# Patient Record
Sex: MALE | Race: WHITE | HISPANIC OR LATINO | URBAN - METROPOLITAN AREA
[De-identification: names, ages, dates, MRNs, and addresses within clinical notes are randomized per-mention and may not be internally consistent; named-entity substitution may affect disease eponyms.]

---

## 2017-07-02 ENCOUNTER — EMERGENCY (EMERGENCY)
Facility: HOSPITAL | Age: 22
LOS: 0 days | Discharge: HOME | End: 2017-07-02
Attending: EMERGENCY MEDICINE | Admitting: EMERGENCY MEDICINE
Payer: MEDICAID

## 2017-07-02 VITALS
WEIGHT: 160.06 LBS | HEIGHT: 67 IN | HEART RATE: 100 BPM | RESPIRATION RATE: 18 BRPM | OXYGEN SATURATION: 100 % | TEMPERATURE: 98 F | SYSTOLIC BLOOD PRESSURE: 122 MMHG | DIASTOLIC BLOOD PRESSURE: 68 MMHG

## 2017-07-02 VITALS
TEMPERATURE: 99 F | HEART RATE: 88 BPM | RESPIRATION RATE: 16 BRPM | DIASTOLIC BLOOD PRESSURE: 77 MMHG | OXYGEN SATURATION: 100 % | SYSTOLIC BLOOD PRESSURE: 128 MMHG
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
## 2017-07-02 DIAGNOSIS — S62.326A DISPLACED FRACTURE OF SHAFT OF FIFTH METACARPAL BONE, RIGHT HAND, INITIAL ENCOUNTER FOR CLOSED FRACTURE: ICD-10-CM

## 2017-07-02 DIAGNOSIS — S61.217A LACERATION WITHOUT FOREIGN BODY OF LEFT LITTLE FINGER WITHOUT DAMAGE TO NAIL, INITIAL ENCOUNTER: ICD-10-CM
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
## 2017-07-02 DIAGNOSIS — S69.82XA OTHER SPECIFIED INJURIES OF LEFT WRIST, HAND AND FINGER(S), INITIAL ENCOUNTER: ICD-10-CM
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
## 2017-07-02 PROCEDURE — 73120 X-RAY EXAM OF HAND: CPT | Mod: 26,50

## 2017-07-02 PROCEDURE — 72125 CT NECK SPINE W/O DYE: CPT | Mod: 26

## 2017-07-02 PROCEDURE — 70450 CT HEAD/BRAIN W/O DYE: CPT | Mod: 26

## 2017-07-02 PROCEDURE — 99284 EMERGENCY DEPT VISIT MOD MDM: CPT | Mod: 25

## 2017-07-02 PROCEDURE — 73130 X-RAY EXAM OF HAND: CPT | Mod: 26,RT

## 2017-07-02 RX ORDER — ONDANSETRON 8 MG/1
4 TABLET, FILM COATED ORAL ONCE
Qty: 0 | Refills: 0 | Status: COMPLETED | OUTPATIENT
Start: 2017-07-02 | End: 2017-07-02

## 2017-07-02 RX ORDER — CEFAZOLIN SODIUM 1 G
1000 VIAL (EA) INJECTION ONCE
Qty: 0 | Refills: 0 | Status: COMPLETED | OUTPATIENT
Start: 2017-07-02 | End: 2017-07-02

## 2017-07-02 RX ORDER — CEPHALEXIN 500 MG
1 CAPSULE ORAL
Qty: 20 | Refills: 0 | OUTPATIENT
Start: 2017-07-02 | End: 2017-07-07

## 2017-07-02 RX ORDER — MORPHINE SULFATE 50 MG/1
4 CAPSULE, EXTENDED RELEASE ORAL ONCE
Qty: 0 | Refills: 0 | Status: DISCONTINUED | OUTPATIENT
Start: 2017-07-02 | End: 2017-07-02

## 2017-07-02 RX ORDER — SODIUM CHLORIDE 9 MG/ML
1000 INJECTION INTRAMUSCULAR; INTRAVENOUS; SUBCUTANEOUS ONCE
Qty: 0 | Refills: 0 | Status: COMPLETED | OUTPATIENT
Start: 2017-07-02 | End: 2017-07-02

## 2017-07-02 RX ORDER — TETANUS TOXOID, REDUCED DIPHTHERIA TOXOID AND ACELLULAR PERTUSSIS VACCINE, ADSORBED 5; 2.5; 8; 8; 2.5 [IU]/.5ML; [IU]/.5ML; UG/.5ML; UG/.5ML; UG/.5ML
0.5 SUSPENSION INTRAMUSCULAR ONCE
Qty: 0 | Refills: 0 | Status: COMPLETED | OUTPATIENT
Start: 2017-07-02 | End: 2017-07-02

## 2017-07-02 RX ADMIN — SODIUM CHLORIDE 1000 MILLILITER(S): 9 INJECTION INTRAMUSCULAR; INTRAVENOUS; SUBCUTANEOUS at 04:20

## 2017-07-02 RX ADMIN — Medication 100 MILLIGRAM(S): at 05:15

## 2017-07-02 RX ADMIN — TETANUS TOXOID, REDUCED DIPHTHERIA TOXOID AND ACELLULAR PERTUSSIS VACCINE, ADSORBED 0.5 MILLILITER(S): 5; 2.5; 8; 8; 2.5 SUSPENSION INTRAMUSCULAR at 05:15

## 2017-07-02 NOTE — ED PROVIDER NOTE - CARE PLAN
Principal Discharge DX:	Closed nondisplaced fracture of base of fifth metacarpal bone of right hand, initial encounter
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
## 2017-07-02 NOTE — ED PROVIDER NOTE - PROGRESS NOTE DETAILS
Latesha Garrido: ED attending Dr. Moore discussed case with Dr. Steinberg who states that he will come to the ED at pt's bedside for eval, and agrees for suture repair. Latesha Garrido: ED attending Dr. Moore discussed case with Dr. Steinberg who states that he will come to the ED at pt's bedside for eval.
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
## 2021-11-26 ENCOUNTER — EMERGENCY (EMERGENCY)
Facility: HOSPITAL | Age: 26
LOS: 0 days | Discharge: HOME | End: 2021-11-26
Attending: HOSPITALIST
Payer: COMMERCIAL

## 2021-11-26 VITALS
HEART RATE: 61 BPM | OXYGEN SATURATION: 99 % | DIASTOLIC BLOOD PRESSURE: 80 MMHG | RESPIRATION RATE: 17 BRPM | TEMPERATURE: 99 F | SYSTOLIC BLOOD PRESSURE: 135 MMHG

## 2021-11-26 VITALS
HEIGHT: 67 IN | TEMPERATURE: 99 F | DIASTOLIC BLOOD PRESSURE: 85 MMHG | WEIGHT: 149.91 LBS | HEART RATE: 66 BPM | RESPIRATION RATE: 18 BRPM | SYSTOLIC BLOOD PRESSURE: 132 MMHG | OXYGEN SATURATION: 100 %

## 2021-11-26 DIAGNOSIS — R07.89 OTHER CHEST PAIN: ICD-10-CM
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
## 2021-11-26 DIAGNOSIS — M79.632 PAIN IN LEFT FOREARM: ICD-10-CM
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
## 2021-11-26 PROCEDURE — 99284 EMERGENCY DEPT VISIT MOD MDM: CPT

## 2021-11-26 PROCEDURE — 73090 X-RAY EXAM OF FOREARM: CPT | Mod: LT

## 2021-11-26 PROCEDURE — 73030 X-RAY EXAM OF SHOULDER: CPT | Mod: LT

## 2021-11-26 PROCEDURE — 93010 ELECTROCARDIOGRAM REPORT: CPT

## 2021-11-26 PROCEDURE — 73090 X-RAY EXAM OF FOREARM: CPT | Mod: 26,LT

## 2021-11-26 PROCEDURE — 93005 ELECTROCARDIOGRAM TRACING: CPT

## 2021-11-26 PROCEDURE — 71046 X-RAY EXAM CHEST 2 VIEWS: CPT

## 2021-11-26 PROCEDURE — 99284 EMERGENCY DEPT VISIT MOD MDM: CPT | Mod: 25

## 2021-11-26 PROCEDURE — 71046 X-RAY EXAM CHEST 2 VIEWS: CPT | Mod: 26

## 2021-11-26 PROCEDURE — 73030 X-RAY EXAM OF SHOULDER: CPT | Mod: 26,LT

## 2021-11-26 RX ORDER — IBUPROFEN 200 MG
600 TABLET ORAL ONCE
Refills: 0 | Status: COMPLETED | OUTPATIENT
Start: 2021-11-26 | End: 2021-11-26

## 2021-11-26 RX ADMIN — Medication 600 MILLIGRAM(S): at 19:54
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
## 2021-11-26 NOTE — ED STATDOCS - NS_ ATTENDINGSCRIBEDETAILS _ED_A_ED_FT
Gia Valdez MD: The history, relevant review of systems, past medical and surgical history, medical decision making, and physical examination was documented by the scribe in my presence and I attest to the accuracy of the documentation.
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
## 2021-11-26 NOTE — ED STATDOCS - MUSCULOSKELETAL, MLM
tender over the chest wall, L shoulder, and lateral L forearm full ROM, normal gait, non tender spine,
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
## 2021-11-26 NOTE — ED STATDOCS - PATIENT PORTAL LINK FT
You can access the FollowMyHealth Patient Portal offered by [facility] by registering at the following website: [email or web address]. By joining [facility]s FollowMyHealth portal, you will also be able to view your health information using other applications (apps) compatible with our system.

## 2021-11-26 NOTE — ED STATDOCS - ATTENDING CONTRIBUTION TO CARE
I, Gia Valdez MD,  performed the initial face to face bedside interview with this patient regarding history of present illness, review of symptoms and relevant past medical, social and family history.  I completed an independent physical examination.  I was the initial provider who evaluated this patient. I have signed out the follow up of any pending tests (i.e. labs, radiological studies) to the resident.  I have communicated the patient’s plan of care and disposition with the resident.

## 2023-01-16 ENCOUNTER — APPOINTMENT (OUTPATIENT)
Dept: ORTHOPEDIC SURGERY | Facility: CLINIC | Age: 28
End: 2023-01-16
Payer: OTHER MISCELLANEOUS

## 2023-01-16 DIAGNOSIS — Z78.9 OTHER SPECIFIED HEALTH STATUS: ICD-10-CM

## 2023-01-16 DIAGNOSIS — S13.9XXA SPRAIN OF JOINTS AND LIGAMENTS OF UNSPECIFIED PARTS OF NECK, INITIAL ENCOUNTER: ICD-10-CM

## 2023-01-16 PROBLEM — Z00.00 ENCOUNTER FOR PREVENTIVE HEALTH EXAMINATION: Status: ACTIVE | Noted: 2023-01-16

## 2023-01-16 PROCEDURE — 99204 OFFICE O/P NEW MOD 45 MIN: CPT

## 2023-01-16 PROCEDURE — 99072 ADDL SUPL MATRL&STAF TM PHE: CPT

## 2023-01-16 PROCEDURE — 72100 X-RAY EXAM L-S SPINE 2/3 VWS: CPT

## 2023-01-16 RX ORDER — IBUPROFEN 800 MG/1
TABLET, FILM COATED ORAL
Refills: 0 | Status: ACTIVE | COMMUNITY

## 2023-01-17 PROBLEM — S13.9XXA CERVICAL SPRAIN: Status: ACTIVE | Noted: 2023-01-16
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
## 2023-01-18 NOTE — ASSESSMENT
[FreeTextEntry1] : Upon examination Cervical sprain was noted. Lumbar sprain. Lumbar xray also showed signs of scoliosis. Patient was given a script for PT. Medrol dose pack and Flexeril prescribed to help with pain and inflammation. \par A Medrol dose Joseph was prescribed today.  Patient was informed that while taking the Medrol, they shouldn't be taking any other anti-inflammatories.  Pt understands and all questions were answered, and possible side effects of medrol were discussed.\par  \par F/u 3 weeks \par Entered by Zoey Moran as acting Scribe.\par Dr. Oconnor Attestation\par The documentation recorded by the scribe, in my presence, accurately reflects the service I, Dr. Oconnor, personally performed, and the decisions made by me with my edits as appropriate.\par \par

## 2023-01-18 NOTE — PHYSICAL EXAM
[Flexion] : flexion [Extension] : extension [Bending to left] : bending to left [Bending to right] : bending to right [Scoliosis] : Scoliosis [There are no fractures, subluxations or dislocations. No significant abnormalities are seen] : There are no fractures, subluxations or dislocations. No significant abnormalities are seen [] : no atrophy [de-identified] : Normal Achilles reflex [FreeTextEntry1] : mild RT radiculopathy and lumbar sprain  [TWNoteComboBox7] : forward flexion 30 degrees [de-identified] : extension 10 degrees [de-identified] : left lateral bending 10 degrees [de-identified] : left lateral rotation 10 degrees [de-identified] : right lateral bending 10 degrees [TWNoteComboBox6] : right lateral rotation 5 degrees

## 2023-01-18 NOTE — WORK
[Was the competent medical cause of the injury] : was the competent medical cause of the injury [Are consistent with the injury] : are consistent with the injury [Consistent with my objective findings] : consistent with my objective findings [Total] : total [Does not reveal pre-existing condition(s) that may affect treatment/prognosis] : does not reveal pre-existing condition(s) that may affect treatment/prognosis [Patient] : patient [Less than Sedentary Work:] :  Less than Sedentary Work: Unable to meet the requirement of Sedentary Work. [FreeTextEntry1] : good

## 2023-02-02 ENCOUNTER — APPOINTMENT (OUTPATIENT)
Dept: ORTHOPEDIC SURGERY | Facility: CLINIC | Age: 28
End: 2023-02-02
Payer: OTHER MISCELLANEOUS

## 2023-02-02 PROCEDURE — 99072 ADDL SUPL MATRL&STAF TM PHE: CPT

## 2023-02-02 PROCEDURE — 99214 OFFICE O/P EST MOD 30 MIN: CPT

## 2023-02-02 NOTE — ASSESSMENT
[FreeTextEntry1] : Pt received note to stay out of work until further notice\par follow up 3 weeks\par Pt has started PT and will continue \par \par Entered by Kia Samuels as acting Scribe.\par Dr. Oconnor Attestation\par The documentation recorded by the scribe, in my presence, accurately reflects the service I, Dr. Oconnor, personally performed, and the decisions made by me with my edits as appropriate.\par \par

## 2023-02-02 NOTE — PHYSICAL EXAM
[Flexion] : flexion [Extension] : extension [Bending to left] : bending to left [Bending to right] : bending to right [] : no atrophy [FreeTextEntry9] : l [de-identified] : Normal Achilles reflex [TWNoteComboBox7] : forward flexion 30 degrees [de-identified] : extension 10 degrees [de-identified] : left lateral bending 10 degrees [de-identified] : left lateral rotation 10 degrees [de-identified] : right lateral bending 10 degrees [TWNoteComboBox6] : right lateral rotation 5 degrees
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
## 2023-02-22 ENCOUNTER — APPOINTMENT (OUTPATIENT)
Dept: ORTHOPEDIC SURGERY | Facility: CLINIC | Age: 28
End: 2023-02-22
Payer: OTHER MISCELLANEOUS

## 2023-02-22 PROCEDURE — 99213 OFFICE O/P EST LOW 20 MIN: CPT

## 2023-02-22 PROCEDURE — 99072 ADDL SUPL MATRL&STAF TM PHE: CPT

## 2023-02-22 NOTE — ASSESSMENT
[FreeTextEntry1] : Pt is still having low back pain. He has gone to physical therapy without improvement. In an effort to try to get the pt back to work, I am requesting authorization for an MRI of his lumbar spine. In the meantime, we will continue physical therapy.\par He has right buttocks pain. He has no neurological deficits.\par \par Entered by Kia Samuels as acting Scribe.\par Dr. Oconnor Attestation\par The documentation recorded by the scribe, in my presence, accurately reflects the service I, Dr. Oconnor, personally performed, and the decisions made by me with my edits as appropriate.\par \par
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
## 2023-02-22 NOTE — PHYSICAL EXAM
[Flexion] : flexion [Extension] : extension [Bending to left] : bending to left [Bending to right] : bending to right [] : patient ambulates without assistive device [FreeTextEntry8] : right buttocks pain [de-identified] : Normal Achilles reflex [TWNoteComboBox7] : forward flexion 30 degrees [de-identified] : extension 10 degrees [de-identified] : left lateral bending 10 degrees [de-identified] : left lateral rotation 10 degrees [de-identified] : right lateral bending 10 degrees [TWNoteComboBox6] : right lateral rotation 5 degrees

## 2023-03-16 ENCOUNTER — FORM ENCOUNTER (OUTPATIENT)
Age: 28
End: 2023-03-16

## 2023-03-22 ENCOUNTER — NON-APPOINTMENT (OUTPATIENT)
Age: 28
End: 2023-03-22

## 2023-03-29 ENCOUNTER — FORM ENCOUNTER (OUTPATIENT)
Age: 28
End: 2023-03-29

## 2023-04-07 ENCOUNTER — APPOINTMENT (OUTPATIENT)
Dept: ORTHOPEDIC SURGERY | Facility: CLINIC | Age: 28
End: 2023-04-07
Payer: OTHER MISCELLANEOUS

## 2023-04-07 PROCEDURE — 99213 OFFICE O/P EST LOW 20 MIN: CPT

## 2023-04-07 NOTE — WORK
[Sprain/Strain] : sprain/strain [Was the competent medical cause of the injury] : was the competent medical cause of the injury [Are consistent with the injury] : are consistent with the injury [Consistent with my objective findings] : consistent with my objective findings [Moderate Partial] : moderate partial [Does not reveal pre-existing condition(s) that may affect treatment/prognosis] : does not reveal pre-existing condition(s) that may affect treatment/prognosis [Can return to work with limitations on ______] : can return to work with limitations on [unfilled] [Lifting] : lifting [N/A] : : Not Applicable [Patient] : patient [No Rx restrictions] : No Rx restrictions. [I provided the services listed above] :  I provided the services listed above. [FreeTextEntry1] : 50% [Light Work:] : Light Work: Exerting up to 20 pounds of force occasionally, and/or up to 10 pounds of force frequently and/or negligible amount of force constantly to move objects. Physical demand requirements are in excess of those for Sedentary Work. Even though the weight lifted may only be a negligible amount, a job should be rated Light Work: (1) when it requires walking or standing to a significant degree: or (2) when it requires sitting most of the time but entails pushing and/or pulling of arm or leg controls: and/or (3) when the job requires working at a production rate pace entailing the constant pushing and/or pulling of materials even though the weight of those materials is negligible. NOTE: The constant stress of maintaining a production rate pace, especially in an industrial setting, can be and is physically demanding of a worker even though the amount of force exerted is negligible.
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
## 2023-04-07 NOTE — DATA REVIEWED
[MRI] : MRI [Lumbar Spine] : lumbar spine [Report was reviewed and noted in the chart] : The report was reviewed and noted in the chart [I reviewed the films/CD and agree] : I reviewed the films/CD and agree [FreeTextEntry1] : MRI reviewed, demonstrating multiple bulging disks without focal disk herniation

## 2023-04-07 NOTE — IMAGING
[de-identified] : (Physical Exam: Lumbar Spine)\par \par Laterality is bilateral\par \par Patient is in no acute distress, alert and oriented\par Capillary refill is less than 2 seconds in the toes\par Lymphadenopathy is not present\par Peripheral edema is not present\par \par Skin is intact \par Swelling is not present \par Atrophy is not present\par Antalgic gait is not present \par \par Bony tenderness is not present \par Paraspinal tenderness is present \par Hip tenderness is not present\par Bony stepoff is not present\par \par Range of motion is normal\par \par Hip flexion strength is 5/5\par Hip abduction strength is 5/5\par Knee extension strength is 5/5\par Knee flexion strength is 5/5\par Ankle dorsiflexion strength is 5/5\par Ankle plantarflexion strength is 5/5\par Great toe dorsiflexion strength is 5/5\par Great toe plantarflexion strength is 5/5\par \par L3 sensation is intact\par L4 sensation is intact\par L5 sensation is intact\par S1 sensation is intact\par \par Patellar reflex is 2+\par Ankle jerk reflex is 2+\par \par Clonus is negative \par Baez's test is negative \par Straight leg raise is negative\par  [Outside films reviewed] : Outside films reviewed [No bony abnormalities] : No bony abnormalities [Straightening consistent with spasm] : Straightening consistent with spasm

## 2023-04-07 NOTE — DISCUSSION/SUMMARY
[de-identified] : (Assessment and Plan)\par \par History, clinical examination and imaging were most consistent with:\par -lumbar paraspinal strain \par \par The diagnosis was explained in detail. The potential non-surgical and surgical treatments were reviewed. The relative risks and benefits of each option were considered relative to the patient’s age, activity level, medical history, symptom severity and previously attempted treatments. \par \par -Non-surgical treatment was selected. \par -Patient defers additional PT and will proceed with a home exercise program. \par -Over the counter NSAID as needed for pain. GI precautions discussed.\par -Referral will be provided to pain management to evaluate for injection.\par -Work status will be return to work as light duty, no heavy lifting at this time.\par -Follow up with me in 6-8 weeks as needed.\par \par (MDM) \par \par Problem Complexity\par -Moderate: chronic illness with exacerbation\par \par Risk\par -Low: over the counter medication\par \par -Patient has not been seen by another provider in my practice within the past 2 years who specializes in orthopedic sports medicine, shoulder or elbow surgery. \par \par The patient was advised to consult with their primary medical provider prior to initiation of any new medications to reduce the risk of adverse effects specific to their long-term home medications and medical history. The risk of gastrointestinal irritation and kidney injury specific to long-term NSAID use was discussed.   \par \par

## 2023-05-30 ENCOUNTER — APPOINTMENT (OUTPATIENT)
Dept: PAIN MANAGEMENT | Facility: CLINIC | Age: 28
End: 2023-05-30
Payer: OTHER MISCELLANEOUS

## 2023-05-30 VITALS — WEIGHT: 265 LBS | HEIGHT: 70 IN | BODY MASS INDEX: 37.94 KG/M2

## 2023-05-30 PROCEDURE — 99204 OFFICE O/P NEW MOD 45 MIN: CPT

## 2023-05-30 RX ORDER — METHYLPREDNISOLONE 4 MG/1
4 TABLET ORAL
Qty: 1 | Refills: 0 | Status: DISCONTINUED | COMMUNITY
Start: 2023-01-19 | End: 2023-05-30

## 2023-05-30 RX ORDER — METHYLPREDNISOLONE 4 MG/1
4 TABLET ORAL
Qty: 1 | Refills: 0 | Status: DISCONTINUED | COMMUNITY
Start: 2023-01-16 | End: 2023-05-30

## 2023-05-30 RX ORDER — CYCLOBENZAPRINE HYDROCHLORIDE 10 MG/1
10 TABLET, FILM COATED ORAL
Qty: 10 | Refills: 0 | Status: DISCONTINUED | COMMUNITY
Start: 2023-01-19 | End: 2023-05-30

## 2023-05-30 NOTE — PHYSICAL EXAM
[de-identified] : Constitutional:  \par - No acute distress  \par - Well developed; well nourished  \par \par Neurological:  \par - normal mood and affect  \par - alert and oriented x 3   \par \par Cardiovascular:  \par - grossly normal \par \par Lumbar Spine Exam: \par \par Inspection: \par erythema (-) \par ecchymosis (-) \par rashes (-) \par alignment: no scoliosis \par \par Palpation: \par Midline lumbar tenderness:            (-) \par midline thoracic tenderness:          (-) \par Lumbar paraspinal tenderness:  L (-) ; R (-) \par thoracic paraspinal tenderness: L (-) ; R (-) \par sciatic nerve tenderness :          L (-) ; R (-) \par SI joint tenderness:                     L (-) ; R (-) \par GTB tenderness:                        L (-);  R (-) \par \par ROM: Full ROM pain with extreme flexion and extension\par \par Strength: \par                                    Right       Left    \par Hip Flexion:                (5/5)       (5/5) \par Quadriceps:               (5/5)       (5/5) \par Hamstrings:                (5/5)       (5/5) \par Ankle Dorsiflexion:     (5/5)       (5/5) \par EHL:                           (5/5)       (5/5) \par Ankle Plantarflexion:  (5/5)       (5/5) \par \par Special Tests: \par SLR:                            R (+) ; L (-) \par Facet loading:             R (-) ; L (-) \par SHAYNE test:                R (-) ; L (-) \par Hamstring tightness:   R (-);  L (-) \par \par Neurologic: \par SILT throughout right lower extremity \par SILT throughout left lower extremity \par \par Reflexes normal and symmetric bilateral lower extremities \par \par Gait: \par non- antalgic gait \par ambulates without assistive device

## 2023-05-30 NOTE — HISTORY OF PRESENT ILLNESS
[Lower back] : lower back [Work related] : work related [Sudden] : sudden [6] : 6 [Burning] : burning [Dull/Aching] : dull/aching [Intermittent] : intermittent [Work] : work [Meds] : meds [Injection therapy] : injection therapy [Bending forward] : bending forward [Extending back] : extending back [Full time] : Work status: full time [FreeTextEntry1] : The patient presents for initial evaluation regarding their low back pain.   Patient was referred by Dr. Kowalski.\par Patient injured himself while working as a  on 1/4/2023, he was lifting wood when he felt immediate onset pain. Patients pain is in the lower back with radiation up to the mid back, initially when the injury first happened he experienced radicular pain to the right buttock and leg, but these symptoms have improved with time. Patient has been through PT, and been prescribed cyclobenzaprine as well as 2 rounds of oral steroids, he currently takes OTC ibuprofen for pain management.  Patient working full-time as of late April.\par \par WC DOI: 1/4/2023\par Occupation: Construction\par Work status: Full-time\par \par Subjective weakness: No \par Lower extremity paresthesias: No \par Bladder/bowel dysfunction: No \par \par Injections: No \par \par Pertinent Surgical History: N/A \par \par Imaging: \par \par 1) MRI Lumbar Spine (3/30/2023) - [facility]\par There is a transitional appearing lumbosacral junction. For the purposes of this report the vertebral bodies will be enumerated such that S1 is partially lumbarized, there is a rudimentary S1-2 intervertebral disc space; by this designation the conus medullaris tip is at the upper L2 endplate level.\par At the L3-4 level there is a posterior disc bulge impressing upon the thecal sac.\par At the L5-S1 level there is a posterior disc herniation approaching the ventral thecal sac surface. There is a posterior annular tear.\par \par Physician Disclaimer: I have personally reviewed and confirmed all HPI data with the patient.  [] : no [FreeTextEntry3] : 01/04/2023 [FreeTextEntry7] : midback [FreeTextEntry8] : a [de-identified] : lumbar bea at [facility]  [de-identified] : construction

## 2023-05-30 NOTE — WORK
[Partial] : partial [No Rx restrictions] : No Rx restrictions. [I provided the services listed above] :  I provided the services listed above. [FreeTextEntry3] : Degree of impairment with regard to lumbar spine only

## 2023-06-06 ENCOUNTER — FORM ENCOUNTER (OUTPATIENT)
Age: 28
End: 2023-06-06

## 2023-06-09 ENCOUNTER — APPOINTMENT (OUTPATIENT)
Dept: ORTHOPEDIC SURGERY | Facility: CLINIC | Age: 28
End: 2023-06-09
Payer: OTHER MISCELLANEOUS

## 2023-06-09 VITALS — HEIGHT: 70 IN | WEIGHT: 250 LBS | BODY MASS INDEX: 35.79 KG/M2

## 2023-06-09 PROCEDURE — 99215 OFFICE O/P EST HI 40 MIN: CPT

## 2023-06-09 NOTE — HISTORY OF PRESENT ILLNESS
[Lower back] : lower back [Work related] : work related [Sudden] : sudden [5] : 5 [9] : 9 [Burning] : burning [Sharp] : sharp [Throbbing] : throbbing [Tingling] : tingling [Constant] : constant [Work] : work [Sleep] : sleep [Full time] : Work status: full time [de-identified] : ( Code 775030) Patient presents today with lower back pain after buying plywood, almost slipped, didn't want to break it and grabbed the plywood wrong at work on 1/4/23. Went to [facility], was given an injection and medication at the hospital. \par  Saw Dr. Oconnor, had xray, sent for MRI, and PT, and was prescribed Medrol and Cyclobenzaprine. Saw Dr. South 5/30/23,  approved injection and he will schedule it. States he completed 2 months of PT at [facility] in [place]. States his pain radiates into the buttock and up into the mid and upper back, has some tingling. Admits to taking Advil for pain. Admits to using OTC pain patches. Had lumbar spine xray at [facility] and MRI at [facility]. [] : no [FreeTextEntry3] : 1/4/23 [FreeTextEntry7] : into the buttock and up into the mid and upper back [de-identified] : lumbar spine xray at [facility] and MRI at [facility] [de-identified] : Construction

## 2023-06-09 NOTE — REASON FOR VISIT
[FreeTextEntry2] : lower back pain after buying plywood, almost slipped, didn't want to break it and grabbed the plywood wrong at work on 1/4/23 WC INJURY

## 2023-06-09 NOTE — ASSESSMENT
[FreeTextEntry1] : Transitional anatomy \par HNP L5-S1\par \par Worker's compensation 50% temporarily disabled \par \par Patient has JANET approved yesterday, patient waiting to schedule.\par \par Patient will begin physical therapy. \par \par Recommend: - NSAID - Heating pad - Muscle relaxer - Core strengthening exercise - Hamstring stretching exercise Patient is given back rehabilitation exercise book. \par \par Follow up in 2 months

## 2023-06-09 NOTE — DATA REVIEWED
[MRI] : MRI [Lumbar Spine] : lumbar spine [Report was reviewed and noted in the chart] : The report was reviewed and noted in the chart [I independently reviewed and interpreted images and report] : I independently reviewed and interpreted images and report [FreeTextEntry1] : Transitional anatomy \par HNP L5-S1

## 2023-06-15 ENCOUNTER — APPOINTMENT (OUTPATIENT)
Dept: ORTHOPEDIC SURGERY | Facility: CLINIC | Age: 28
End: 2023-06-15
Payer: OTHER MISCELLANEOUS

## 2023-06-15 VITALS — WEIGHT: 250 LBS | HEIGHT: 70 IN | BODY MASS INDEX: 35.79 KG/M2

## 2023-06-15 PROCEDURE — 99215 OFFICE O/P EST HI 40 MIN: CPT

## 2023-06-15 RX ORDER — METHYLPREDNISOLONE 4 MG/1
4 TABLET ORAL
Qty: 1 | Refills: 0 | Status: ACTIVE | COMMUNITY
Start: 2023-06-15 | End: 1900-01-01

## 2023-06-15 NOTE — ASSESSMENT
[FreeTextEntry1] : Transitional anatomy \par HNP L5-S1\par Right foraminal HNP L5-S1\par \par Worker's compensation 100% temporarily disabled \par \par Patient will begin Medrol.  Patient advised not to take any NSAIDs while taking the steroids. \par \par Patient will begin physical therapy. \par \par Recommend: - NSAID - Heating pad - Muscle relaxer - Core strengthening exercise - Hamstring stretching exercise Patient is given back rehabilitation exercise book. \par \par Follow up in 2 months

## 2023-06-15 NOTE — HISTORY OF PRESENT ILLNESS
[Lower back] : lower back [Work related] : work related [Sudden] : sudden [5] : 5 [9] : 9 [Burning] : burning [Sharp] : sharp [Throbbing] : throbbing [Tingling] : tingling [Constant] : constant [Work] : work [Sleep] : sleep [Full time] : Work status: full time [de-identified] : ( Code 222069) Follow up lumbar spine. States he has been having increased pain since Monday. States he feels strong pulsations. States he feels cramping and a warm sensation. States he is unable to sleep. States he has difficulty working. Admits to taking Advil for pain. Admits to using OTC pain patches.  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 and MRI at [facility]

## 2023-06-28 ENCOUNTER — APPOINTMENT (OUTPATIENT)
Dept: PAIN MANAGEMENT | Facility: CLINIC | Age: 28
End: 2023-06-28
Payer: OTHER MISCELLANEOUS

## 2023-06-28 PROCEDURE — 64483 NJX AA&/STRD TFRM EPI L/S 1: CPT | Mod: LT

## 2023-06-28 NOTE — PROCEDURE
[FreeTextEntry3] : Date of Service: 06/28/2023 \par \par Account: [id number]\par \par Patient: JOSE G JASSO \par \par YOB: 1995\par \par Age: 27 year \par \par Surgeon:      Asa South DO\par \par Assistant:    None\par \par Pre-Operative Diagnosis:         Lumbosacral Radiculopathy                 \par \par Post Operative Diagnosis:       Lumbosacral Radiculopathy               \par \par Procedure:             Bilateral L5-S1 transforaminal epidural steroid injection under fluoroscopic guidance.\par \par Anesthesia: MAC\par \par This procedure was carried out using fluoroscopic guidance.  The risks and benefits of the procedure were discussed extensively with the patient.  The consent of the patient was obtained and the following procedure was performed.  A timeout was performed with all essential staff present and the site and side were verified.\par \par The right L5-S1 neural foramen was then identified on right oblique "mendez dog" anatomical view at the 6 o' clock position using fluoroscopic guidance, and the area was marked. The overlying skin and subcutaneous structures were anesthetized using sterile technique with 1% Lidocaine.   A 22 gauge 5 inch spinal needle was directed toward the inferior (6 o'clock) position of the pedicle, which formed the roof of the identified foramen.  Once in the epidural space, after negative aspiration for heme and CSF, 1cc of Omnipaque contrast was injected to confirm epidural location and assess filling defects and rule out intravascular needle placement. \par \par Lumbar epidurogram showed no intravascular or intrathecal flow pattern.  No blood or CSF was aspirated.  Omnipaque spread medially in epidural space and outlined the exiting nerve root.  After this, 2.5 cc of a mixture of 4cc of preservative free normal saline plus 40 mg of Kenalog was injected in the epidural space.\par \par Then attention was focused to the left L5-S1 neural foramen. This was then identified on left oblique "mendez dog" anatomical view at the 6 o' clock position using fluoroscopic guidance, and the area was marked.  The overlying skin and subcutaneous structures were anesthetized using sterile technique with 1% Lidocaine.  A 22 gauge 5 inch spinal needle was directed toward the inferior (6 o'clock) position of the pedicle, which formed the roof of the identified foramen.  Once in the epidural space, after negative aspiration for heme and CSF, 1cc of Omnipaque contrast was injected to confirm epidural location and assess filling defects and rule out intravascular needle placement. \par \par The following contrast flow observed: no intravascular or intrathecal flow pattern was noted.  No blood or CSF was aspirated. Omnipaque spread medially in epidural space.  \par \par After this, the remainder of the injectate listed above was injected in the epidural space.\par \par Vital signs remained normal throughout the procedure.  The patient tolerated the procedure well.  There were no immediate complications from the performed procedure.  The patient was instructed to apply ice over the injection sites for twenty minutes every two hours for the next 24 hours.\par \par Disposition:\par      1.  The patient was advised to F/U in 1-2 weeks to assess the response to the injection.\par      2.  The patient was also instructed to contact me immediately if there were any concerns related to the procedure performed.

## 2023-07-18 ENCOUNTER — APPOINTMENT (OUTPATIENT)
Dept: PAIN MANAGEMENT | Facility: CLINIC | Age: 28
End: 2023-07-18
Payer: OTHER MISCELLANEOUS

## 2023-07-18 VITALS — WEIGHT: 250 LBS | HEIGHT: 70 IN | BODY MASS INDEX: 35.79 KG/M2

## 2023-07-18 DIAGNOSIS — M48.061 SPINAL STENOSIS, LUMBAR REGION WITHOUT NEUROGENIC CLAUDICATION: ICD-10-CM

## 2023-07-18 DIAGNOSIS — M51.36 OTHER INTERVERTEBRAL DISC DEGENERATION, LUMBAR REGION: ICD-10-CM

## 2023-07-18 PROCEDURE — 99214 OFFICE O/P EST MOD 30 MIN: CPT

## 2023-07-18 NOTE — WORK
[No Rx restrictions] : No Rx restrictions. [I provided the services listed above] :  I provided the services listed above. [Mild Partial] : mild partial [FreeTextEntry3] : Degree of impairment with regard to lumbar spine only

## 2023-07-18 NOTE — ASSESSMENT
[FreeTextEntry1] : A discussion regarding available pain management treatment options occurred with the patient.  These included interventional, rehabilitative, pharmacological, and alternative modalities. We will proceed with the following:  \par \par Interventional treatment options:\par - We will proceed with L4-L5 LESI (40 mg Kenalog) with return of severe radicular pain - will call\par - Advised that tachycardia may present once again following repeat corticosteroid administration\par - see additional instructions below  \par \par Rehabilitative options:   \par - Completed prior physical therapy trial with benefit\par - participation in active HEP was discussed and encouraged\par - Home exercise sheet provided\par \par Medication based treatment options:  \par - continue ibuprofen 400-600 mg TID as needed\par - Continue topical OTC analgesics as needed\par - see additional instructions below  \par \par Complementary treatment options:  \par - Weight management and lifestyle modifications discussed  \par \par Additional treatment recommendations as follows:  \par - Follow-up with Dr. Li as direct\par - Patient will follow-up for repeat injection or as needed basis\par \par The risks, benefits and alternatives of the proposed procedure were explained in detail with the patient. The risks outlined include but are not limited to infection, bleeding, post- dural puncture headache, nerve injury, a temporary increase in pain, failure to resolve symptoms, allergic reaction, and possible elevation of blood sugar in diabetics if using corticosteroid.  All questions were answered to patient's apparent satisfaction and he/she verbalized an understanding.\par \par We have discussed the risks, benefits, and alternatives NSAID therapy including but not limited to the risk of bleeding, thrombosis, gastric mucosal irritation/ulceration, allergic reaction and kidney dysfunction; the patient verbalizes an understanding.

## 2023-07-18 NOTE — HISTORY OF PRESENT ILLNESS
[Lower back] : lower back [Work related] : work related [Sudden] : sudden [6] : 6 [Burning] : burning [Dull/Aching] : dull/aching [Intermittent] : intermittent [Work] : work [Meds] : meds [Injection therapy] : injection therapy [Bending forward] : bending forward [Extending back] : extending back [Full time] : Work status: full time [FreeTextEntry1] : 7/18/2023 - Patient presents for FUV after a bilateral L5-S1 TFESI on 6/28/2023.  Patient reports minimum of 50% reduction of symptoms.  Patient has reduced medication usage.  Continues to work full-time.  Overall he is pleased with his response to the injection.  Patient reports some tachycardia following injection which lasted for approximately 1 week.\par \par 5/30/2023- The patient presents for initial evaluation regarding their low back pain.   Patient was referred by Dr. Kowalski. Patient injured himself while working as a  on 1/4/2023, he was lifting wood when he felt immediate onset pain. Patients pain is in the lower back with radiation up to the mid back, initially when the injury first happened he experienced radicular pain to the right buttock and leg, but these symptoms have improved with time. Patient has been through PT, and been prescribed cyclobenzaprine as well as 2 rounds of oral steroids, he currently takes OTC ibuprofen for pain management.  Patient working full-time as of late April.\par \par WC DOI: 1/4/2023\par Occupation: Construction\par Work status: Full-time\par \par Injections: \par 1) Bilateral L5-S1 TFESI (6/28/2023)\par \par Pertinent Surgical History: N/A \par \par Imaging: \par \par 1) MRI Lumbar Spine (3/30/2023) - [facility]\par There is a transitional appearing lumbosacral junction. For the purposes of this report the vertebral bodies will be enumerated such that S1 is partially lumbarized, there is a rudimentary S1-2 intervertebral disc space; by this designation the conus medullaris tip is at the upper L2 endplate level.\par At the L3-4 level there is a posterior disc bulge impressing upon the thecal sac.\par At the L5-S1 level there is a posterior disc herniation approaching the ventral thecal sac surface. There is a posterior annular tear.\par \par Physician Disclaimer: I have personally reviewed and confirmed all HPI data with the patient.  [] : no [FreeTextEntry3] : 01/04/2023 [FreeTextEntry7] : midback [de-identified] : lumbar bea at [facility]  [de-identified] : construction

## 2023-07-18 NOTE — PHYSICAL EXAM
[de-identified] : Constitutional:  \par - No acute distress  \par - Well developed; well nourished  \par \par Neurological:  \par - normal mood and affect  \par - alert and oriented x 3   \par \par Cardiovascular:  \par - grossly normal \par \par Lumbar Spine Exam: \par \par Inspection: \par erythema (-) \par ecchymosis (-) \par rashes (-) \par alignment: no scoliosis \par \par Palpation: \par Midline lumbar tenderness:            (-) \par midline thoracic tenderness:          (-) \par Lumbar paraspinal tenderness:  L (-) ; R (-) \par thoracic paraspinal tenderness: L (-) ; R (-) \par sciatic nerve tenderness :          L (-) ; R (-) \par SI joint tenderness:                     L (-) ; R (-) \par GTB tenderness:                        L (-);  R (-) \par \par ROM: Full ROM \par pain with extreme flexion\par \par Strength: \par                                    Right       Left    \par Hip Flexion:                (5/5)       (5/5) \par Quadriceps:               (5/5)       (5/5) \par Hamstrings:                (5/5)       (5/5) \par Ankle Dorsiflexion:     (5/5)       (5/5) \par EHL:                           (5/5)       (5/5) \par Ankle Plantarflexion:  (5/5)       (5/5) \par \par Special Tests: \par SLR:                            R (-) ; L (-) \par Facet loading:             R (-) ; L (-) \par SHAYNE test:                R (-) ; L (-) \par Hamstring tightness:   R (-);  L (-) \par \par Neurologic: \par SILT throughout right lower extremity \par SILT throughout left lower extremity \par \par Reflexes normal and symmetric bilateral lower extremities \par \par Gait: \par non- antalgic gait

## 2023-08-11 ENCOUNTER — APPOINTMENT (OUTPATIENT)
Dept: ORTHOPEDIC SURGERY | Facility: CLINIC | Age: 28
End: 2023-08-11
Payer: OTHER MISCELLANEOUS

## 2023-08-11 PROCEDURE — 99215 OFFICE O/P EST HI 40 MIN: CPT

## 2023-08-18 NOTE — ASSESSMENT
[FreeTextEntry1] : MRI lumbar spine demonstrates: Transitional anatomy  HNP L5-S1 Right foraminal HNP L5-S1  Patient with persistent symptoms refractory to non operative care. Patient is seeking surgical options. Patient is a candidate for surgery after failing extensive non operative care.  Worker's compensation 100% temporarily disabled as patient will need surgery.    Patient will continue physical therapy.   Recommend: - NSAID - Heating pad - Muscle relaxer - Core strengthening exercise - Hamstring stretching exercise Patient is given back rehabilitation exercise book.   Follow up in 2 months.

## 2023-08-18 NOTE — HISTORY OF PRESENT ILLNESS
[Lower back] : lower back [Work related] : work related [Sudden] : sudden [5] : 5 [9] : 9 [Burning] : burning [Sharp] : sharp [Throbbing] : throbbing [Tingling] : tingling [Constant] : constant [Work] : work [Sleep] : sleep [Full time] : Work status: full time [de-identified] : ( Code [id number]) Follow up lumbar spine  Pt had JANET with Dr South on 5/28/23 with short term relief- states pain started to return after ~2 months.  Denies going to PT- reports doing HEP with some relief.  Reports little improvement with Medrol Dose pack- states injection helped more.  [] : no [FreeTextEntry3] : 1/4/23 [FreeTextEntry7] : into the buttock and up into the mid and upper back [de-identified] : lumbar spine xray at [facility] and MRI at [facility] [de-identified] : Construction

## 2023-09-06 RX ORDER — IBUPROFEN 600 MG/1
600 TABLET, FILM COATED ORAL TWICE DAILY
Qty: 60 | Refills: 2 | Status: ACTIVE | COMMUNITY
Start: 2023-09-06 | End: 1900-01-01

## 2023-09-20 ENCOUNTER — APPOINTMENT (OUTPATIENT)
Dept: PAIN MANAGEMENT | Facility: CLINIC | Age: 28
End: 2023-09-20
Payer: OTHER MISCELLANEOUS

## 2023-09-20 PROCEDURE — 62323 NJX INTERLAMINAR LMBR/SAC: CPT

## 2023-10-04 ENCOUNTER — APPOINTMENT (OUTPATIENT)
Dept: ORTHOPEDIC SURGERY | Facility: CLINIC | Age: 28
End: 2023-10-04
Payer: OTHER MISCELLANEOUS

## 2023-10-04 DIAGNOSIS — S39.012A STRAIN OF MUSCLE, FASCIA AND TENDON OF LOWER BACK, INITIAL ENCOUNTER: ICD-10-CM

## 2023-10-04 PROCEDURE — 99215 OFFICE O/P EST HI 40 MIN: CPT

## 2023-10-10 ENCOUNTER — APPOINTMENT (OUTPATIENT)
Dept: PAIN MANAGEMENT | Facility: CLINIC | Age: 28
End: 2023-10-10
Payer: OTHER MISCELLANEOUS

## 2023-10-10 VITALS — BODY MASS INDEX: 35.79 KG/M2 | WEIGHT: 250 LBS | HEIGHT: 70 IN

## 2023-10-10 DIAGNOSIS — M54.16 RADICULOPATHY, LUMBAR REGION: ICD-10-CM

## 2023-10-10 DIAGNOSIS — M79.18 MYALGIA, OTHER SITE: ICD-10-CM

## 2023-10-10 PROCEDURE — 99213 OFFICE O/P EST LOW 20 MIN: CPT

## 2023-10-13 ENCOUNTER — APPOINTMENT (OUTPATIENT)
Dept: ORTHOPEDIC SURGERY | Facility: CLINIC | Age: 28
End: 2023-10-13

## 2023-11-20 ENCOUNTER — APPOINTMENT (OUTPATIENT)
Dept: ORTHOPEDIC SURGERY | Facility: CLINIC | Age: 28
End: 2023-11-20
Payer: OTHER MISCELLANEOUS

## 2023-11-20 VITALS — WEIGHT: 250 LBS | HEIGHT: 70 IN | BODY MASS INDEX: 35.79 KG/M2

## 2023-11-20 PROCEDURE — 99214 OFFICE O/P EST MOD 30 MIN: CPT

## 2023-12-11 ENCOUNTER — RESULT REVIEW (OUTPATIENT)
Age: 28
End: 2023-12-11

## 2024-01-15 ENCOUNTER — APPOINTMENT (OUTPATIENT)
Dept: ORTHOPEDIC SURGERY | Facility: CLINIC | Age: 29
End: 2024-01-15
Payer: OTHER MISCELLANEOUS

## 2024-01-15 PROCEDURE — 99214 OFFICE O/P EST MOD 30 MIN: CPT

## 2024-01-15 RX ORDER — CYCLOBENZAPRINE HYDROCHLORIDE 10 MG/1
10 TABLET, FILM COATED ORAL
Qty: 30 | Refills: 1 | Status: ACTIVE | COMMUNITY
Start: 2023-01-16 | End: 1900-01-01

## 2024-02-05 NOTE — ASSESSMENT
[FreeTextEntry1] : 3/2023 MRI lumbar spine demonstrates: Transitional anatomy HNP L5-S1 Right foraminal HNP L5-S1  12/2023 MRI of L-Spine was reviewed today and is as follows:  Transitional anatomy HNP L5-S1 with stenosis  Right foraminal HNP L5-S1  Patient presents today for follow up on his lumbar spine. MRI detailed above consistent with findings of previous study. Discussed with patient that given lack of improvement with JANET, PT, and medication management, surgery is indicated.   - Patient with persistent symptoms refractory to non-operative care. Patient is seeking surgical options. Patient is a candidate for surgery after failing extensive non operative care.   - Patient given a prescription for gabapentin 300mg TID today for their nerve pain. Advised patient on potential side effects, including drowsiness.   - Patient given prescription for Flexeril 10mg today. Advised patient that medication may make them drowsy, start by taking it at night.   - Recommend NSAIDs PRN - Recommend heating pad use to decrease muscle spasm - Discussed the importance of home exercises, including but not limited to hamstring stretching and core strengthening  Patient was educated on their diagnosis today. All questions answered and patient expressed understanding.  Worker's compensation 100% temporarily disabled.   Follow up PRN then 2 weeks after surgery

## 2024-02-05 NOTE — HISTORY OF PRESENT ILLNESS
[Lower back] : lower back [Work related] : work related [Sudden] : sudden [5] : 5 [9] : 9 [Burning] : burning [Sharp] : sharp [Throbbing] : throbbing [Tingling] : tingling [Constant] : constant [Work] : work [Sleep] : sleep [Full time] : Work status: full time [de-identified] : ( Code [id number]) Follow up lumbar spine MRI Results at . Patient states he has severe pain radiating down the right leg, has increased numbness/tingling. Patient states his right leg feels "electric shocks" shooting down his right leg. Patient states he can barely doing HEP due to the severity of his pain. Patient admits to using IcyHot with no relief. Patient admits to taking Advil for pain with no relief.  Today's Pain 10/10 [] : no [FreeTextEntry3] : 1/4/23 [de-identified] : lumbar spine xray at [facility] and MRI at [facility] [FreeTextEntry7] : into the buttock and up into the mid and upper back [de-identified] : Construction

## 2024-02-05 NOTE — DATA REVIEWED
[MRI] : MRI [Lumbar Spine] : lumbar spine [I independently reviewed and interpreted images and report] : I independently reviewed and interpreted images and report [FreeTextEntry1] : 12/2023 MRI of L-Spine was reviewed today and is as follows:  Transitional anatomy HNP L5-S1 with stenosis

## 2024-02-05 NOTE — DISCUSSION/SUMMARY
[de-identified] : Patient with transitional anatomy on MRI with sacralization of L5 vertebral body. We are counting herniated disc at L5-S1 level, although radiologist report considered that as L4-5 level.   I discussed non operative and operative treatment options in great detail with the patient. I discussed treatment options, including but not limited to, 1. Non operative treatment - rest, NSAID, physical therapy etc. 2. Interventional treatment - injections etc. 3. Surgical treatment - MIS laminectomy, discectomy L5-S1  - Patient with persistent symptoms refractory to non-operative care. Patient is seeking surgical options. Patient is a candidate for surgery after failing extensive non operative care.  Patient wants to proceed with surgical intervention after failing nonoperative care. I had a lengthy discussion with the patient about the rational and goal of the surgery as well as expected outcome. I explained postoperative rehabilitation and recovery process to the patient. I discussed risks, benefits and alternatives of the procedure in detail with the patient. I counseled patient about risks and possible complications, including but not limited to, infection, bleeding, nerve injury, arterial and venous injury, single or multiple muscle group weakness, dural tear, CSF leak, pseudomeningocele, arachnoiditis, CSF fistula, meningitis, discitis, osteomyelitis, epidural hematoma, DVT, PE, CRPS, MI, paralysis,  recurrent disc herniation, post laminectomy instability, need for fusion, persistent symptoms, and risks of anesthesia. I explained to the patient that the surgical outcome is unpredictable and there is no guarantee that the symptoms will resolve after the surgery. The patient understands and wishes to proceed. All questions were answered and patient was given information to review.

## 2024-02-26 ENCOUNTER — APPOINTMENT (OUTPATIENT)
Dept: ORTHOPEDIC SURGERY | Facility: CLINIC | Age: 29
End: 2024-02-26
Payer: OTHER MISCELLANEOUS

## 2024-02-26 PROCEDURE — 99214 OFFICE O/P EST MOD 30 MIN: CPT

## 2024-02-26 NOTE — HISTORY OF PRESENT ILLNESS
[de-identified] : ( Code 921019 ) Follow up lumbar spine.  Pt reports increase in pain since last visit Denies PT or injections since last visit Reports slight relief with Ibuprofen and some relief with Cyclobenzaprine but states they "make him feel dizzy"  Pt reports continued pain radiating down right leg.  WC APPROVED SURGERY AS OF TODAY.  Today's Pain ~10/10

## 2024-02-26 NOTE — ASSESSMENT
[FreeTextEntry1] : 3/2023 MRI lumbar spine demonstrates: Transitional anatomy HNP L5-S1 Right foraminal HNP L5-S1  12/2023 MRI of L-Spine was reviewed today and is as follows:  Transitional anatomy HNP L5-S1 with stenosis  Right foraminal HNP L5-S1  Patient presents today for follow up on his lumbar spine. MRI detailed above consistent with findings of previous study. Discussed with patient that given lack of improvement with multiple JANET, PT, and medication management, surgery is indicated.   - Patient with persistent symptoms refractory to non-operative care. Patient is seeking surgical options. Patient is a candidate for surgery after failing extensive non operative care.   - Continue gabapentin, muscle relaxers as needed  - Recommend NSAIDs PRN - Recommend heating pad use to decrease muscle spasm - Discussed the importance of home exercises, including but not limited to hamstring stretching and core strengthening  Patient was educated on their diagnosis today. All questions answered and patient expressed understanding.  Worker's compensation 100% temporarily disabled.   Follow up PRN then 2 weeks after surgery

## 2024-02-27 ENCOUNTER — RESULT CHARGE (OUTPATIENT)
Age: 29
End: 2024-02-27

## 2024-03-04 ENCOUNTER — APPOINTMENT (OUTPATIENT)
Dept: ORTHOPEDIC SURGERY | Facility: CLINIC | Age: 29
End: 2024-03-04

## 2024-03-12 ENCOUNTER — NON-APPOINTMENT (OUTPATIENT)
Age: 29
End: 2024-03-12

## 2024-03-12 ENCOUNTER — APPOINTMENT (OUTPATIENT)
Dept: PHYSICAL MEDICINE AND REHAB | Facility: CLINIC | Age: 29
End: 2024-03-12
Payer: OTHER MISCELLANEOUS

## 2024-03-12 VITALS
RESPIRATION RATE: 16 BRPM | HEIGHT: 70 IN | WEIGHT: 270 LBS | OXYGEN SATURATION: 100 % | TEMPERATURE: 97.6 F | SYSTOLIC BLOOD PRESSURE: 124 MMHG | DIASTOLIC BLOOD PRESSURE: 72 MMHG | BODY MASS INDEX: 38.65 KG/M2 | HEART RATE: 88 BPM

## 2024-03-12 DIAGNOSIS — M51.26 OTHER INTERVERTEBRAL DISC DISPLACEMENT, LUMBAR REGION: ICD-10-CM

## 2024-03-12 DIAGNOSIS — M51.37 OTHER INTERVERTEBRAL DISC DEGENERATION, LUMBOSACRAL REGION: ICD-10-CM

## 2024-03-12 DIAGNOSIS — M21.371 FOOT DROP, RIGHT FOOT: ICD-10-CM

## 2024-03-12 DIAGNOSIS — M51.36 OTHER INTERVERTEBRAL DISC DEGENERATION, LUMBAR REGION: ICD-10-CM

## 2024-03-12 DIAGNOSIS — M47.817 SPONDYLOSIS W/OUT MYELOPATHY OR RADICULOPATHY, LUMBOSACRAL REGION: ICD-10-CM

## 2024-03-12 DIAGNOSIS — Z01.818 ENCOUNTER FOR OTHER PREPROCEDURAL EXAMINATION: ICD-10-CM

## 2024-03-12 DIAGNOSIS — M54.16 RADICULOPATHY, LUMBAR REGION: ICD-10-CM

## 2024-03-12 PROCEDURE — 93000 ELECTROCARDIOGRAM COMPLETE: CPT

## 2024-03-12 PROCEDURE — 36410 VNPNXR 3YR/> PHY/QHP DX/THER: CPT

## 2024-03-12 PROCEDURE — 99244 OFF/OP CNSLTJ NEW/EST MOD 40: CPT | Mod: ACP

## 2024-03-12 NOTE — PHYSICAL EXAM
[No Acute Distress] : no acute distress [Well Nourished] : well nourished [Well Developed] : well developed [Well-Appearing] : well-appearing [Normal Sclera/Conjunctiva] : normal sclera/conjunctiva [PERRL] : pupils equal round and reactive to light [EOMI] : extraocular movements intact [Normal Outer Ear/Nose] : the outer ears and nose were normal in appearance [Normal Oropharynx] : the oropharynx was normal [No JVD] : no jugular venous distention [No Lymphadenopathy] : no lymphadenopathy [Supple] : supple [Thyroid Normal, No Nodules] : the thyroid was normal and there were no nodules present [No Respiratory Distress] : no respiratory distress  [No Accessory Muscle Use] : no accessory muscle use [Normal Rate] : normal rate  [Clear to Auscultation] : lungs were clear to auscultation bilaterally [Regular Rhythm] : with a regular rhythm [Normal S1, S2] : normal S1 and S2 [No Murmur] : no murmur heard [No Carotid Bruits] : no carotid bruits [No Abdominal Bruit] : a ~M bruit was not heard ~T in the abdomen [No Varicosities] : no varicosities [Pedal Pulses Present] : the pedal pulses are present [No Edema] : there was no peripheral edema [No Palpable Aorta] : no palpable aorta [No Extremity Clubbing/Cyanosis] : no extremity clubbing/cyanosis [Soft] : abdomen soft [Non Tender] : non-tender [Non-distended] : non-distended [No Masses] : no abdominal mass palpated [Normal Bowel Sounds] : normal bowel sounds [No HSM] : no HSM [Normal Posterior Cervical Nodes] : no posterior cervical lymphadenopathy [Normal Anterior Cervical Nodes] : no anterior cervical lymphadenopathy [No CVA Tenderness] : no CVA  tenderness [No Joint Swelling] : no joint swelling [No Spinal Tenderness] : no spinal tenderness [No Rash] : no rash [Grossly Normal Strength/Tone] : grossly normal strength/tone [Coordination Grossly Intact] : coordination grossly intact [Normal Gait] : normal gait [No Focal Deficits] : no focal deficits [Deep Tendon Reflexes (DTR)] : deep tendon reflexes were 2+ and symmetric [Normal Affect] : the affect was normal [Normal Insight/Judgement] : insight and judgment were intact

## 2024-03-13 LAB
ANION GAP SERPL CALC-SCNC: 13 MMOL/L
BASOPHILS # BLD AUTO: 0.07 K/UL
BASOPHILS NFR BLD AUTO: 0.8 %
BUN SERPL-MCNC: 7 MG/DL
CALCIUM SERPL-MCNC: 9.5 MG/DL
CHLORIDE SERPL-SCNC: 101 MMOL/L
CO2 SERPL-SCNC: 24 MMOL/L
CREAT SERPL-MCNC: 0.66 MG/DL
EGFR: 129 ML/MIN/1.73M2
EOSINOPHIL # BLD AUTO: 0.18 K/UL
EOSINOPHIL NFR BLD AUTO: 2.1 %
GLUCOSE SERPL-MCNC: 93 MG/DL
HCT VFR BLD CALC: 48.4 %
HGB BLD-MCNC: 15.5 G/DL
IMM GRANULOCYTES NFR BLD AUTO: 0.2 %
LYMPHOCYTES # BLD AUTO: 3.09 K/UL
LYMPHOCYTES NFR BLD AUTO: 36.5 %
MAN DIFF?: NORMAL
MCHC RBC-ENTMCNC: 28.3 PG
MCHC RBC-ENTMCNC: 32 GM/DL
MCV RBC AUTO: 88.5 FL
MONOCYTES # BLD AUTO: 0.63 K/UL
MONOCYTES NFR BLD AUTO: 7.4 %
NEUTROPHILS # BLD AUTO: 4.47 K/UL
NEUTROPHILS NFR BLD AUTO: 53 %
PLATELET # BLD AUTO: 279 K/UL
POTASSIUM SERPL-SCNC: 4.3 MMOL/L
RBC # BLD: 5.47 M/UL
RBC # FLD: 13.9 %
SODIUM SERPL-SCNC: 138 MMOL/L
WBC # FLD AUTO: 8.46 K/UL

## 2024-03-13 NOTE — PLAN
[FreeTextEntry1] : Labs Drawn by Dr. Yan Jones due to poor venous access.  Patient required lab testing due to conditions in their past medical history requiring periodic monitoring.  Labs were sent to [facility].  EKG - NSR -79 , TINA - 0.142, No acute T wave changes noted..   Labs reviewed  Increase fluids  Patient is medically optimized at this time and may proceed with proposed procedure.  This clearance has been forwarded to Dr. Li and his office has been contacted providing medical clearance and all associated documents for this patient.  At least  45 minutes was spent with patient face to face examining and reviewing findings/results during this visit. Ample time was provided to answer any questions or address concerns to the patients satisfaction..  I, Danelle Chery, attest that this documentation has been prepared under the direction and in the presence of Provider Yan Jones DNP The documentation recorded by the scribe, in my presence, accurately reflects the service I personally performed, and the decisions made by me with my edits as appropriate. Yan Jones DNP

## 2024-03-13 NOTE — HISTORY OF PRESENT ILLNESS
[FreeTextEntry1] : Medical clearance  [de-identified] : Patient encounter today for medical clearance requested by Dr. Li for lumbar surgery scheduled on  3/27/24 at [facility].   States they have been doing well.  Denies any CP, SOB or diff breathing.  No recent fever, chills, cough or cold type symptoms. No recent LOC or head trauma. Denies a history of seizure disorder. Denies lose bridges, caps, or dentures. Denies complications with anesthesia in the past. Patient had a physical within the last year.  Has no other complaints at this time.

## 2024-03-27 ENCOUNTER — APPOINTMENT (OUTPATIENT)
Dept: ORTHOPEDIC SURGERY | Facility: AMBULATORY SURGERY CENTER | Age: 29
End: 2024-03-27
Payer: OTHER MISCELLANEOUS

## 2024-03-27 PROCEDURE — 63047 LAM FACETEC & FORAMOT LUMBAR: CPT

## 2024-03-27 PROCEDURE — 76000 FLUOROSCOPY <1 HR PHYS/QHP: CPT | Mod: 26,59

## 2024-03-27 RX ORDER — CYCLOBENZAPRINE HYDROCHLORIDE 5 MG/1
5 TABLET, FILM COATED ORAL 3 TIMES DAILY
Qty: 60 | Refills: 0 | Status: ACTIVE | COMMUNITY
Start: 2024-03-27 | End: 1900-01-01

## 2024-03-27 RX ORDER — OXYCODONE AND ACETAMINOPHEN 5; 325 MG/1; MG/1
5-325 TABLET ORAL
Qty: 30 | Refills: 0 | Status: ACTIVE | COMMUNITY
Start: 2024-03-27 | End: 1900-01-01

## 2024-03-28 RX ORDER — GABAPENTIN 300 MG/1
300 CAPSULE ORAL
Qty: 90 | Refills: 1 | Status: ACTIVE | COMMUNITY
Start: 2024-01-15 | End: 1900-01-01

## 2024-04-08 ENCOUNTER — APPOINTMENT (OUTPATIENT)
Dept: ORTHOPEDIC SURGERY | Facility: CLINIC | Age: 29
End: 2024-04-08
Payer: OTHER MISCELLANEOUS

## 2024-04-08 PROCEDURE — 99024 POSTOP FOLLOW-UP VISIT: CPT

## 2024-04-08 NOTE — HISTORY OF PRESENT ILLNESS
[de-identified] : S/P Laminoforaminotomy DIscectomy L4-5 3/27/2024 Pt reports improvement in pain since sx Reports taking Oxycodone, Cyclobenzaprine, and Ibuprofen  Pt reports some continued pain in right side of back and buttock Pt denies fever, chills, SOB

## 2024-04-08 NOTE — ASSESSMENT
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 Patient doing well, minimal back pain. Incision clean, dry, intact. Patient with some residual nerve symptoms, normal for this stage following surgery.    - Patient will start HEP as detailed in home exercise booklet given in office. Emphasized daily stretching and strengthening.   - Continue gabapentin, muscle relaxers as needed  - Recommend NSAIDs PRN - Recommend heating pad use to decrease muscle spasm - Discussed the importance of home exercises, including but not limited to hamstring stretching and core strengthening  Patient was educated on their diagnosis today. All questions answered and patient expressed understanding.  Worker's compensation 100% temporarily disabled.   Follow up in 1 month

## 2024-05-06 ENCOUNTER — APPOINTMENT (OUTPATIENT)
Dept: ORTHOPEDIC SURGERY | Facility: CLINIC | Age: 29
End: 2024-05-06
Payer: OTHER MISCELLANEOUS

## 2024-05-06 DIAGNOSIS — Z98.890 OTHER SPECIFIED POSTPROCEDURAL STATES: ICD-10-CM

## 2024-05-06 PROCEDURE — 99024 POSTOP FOLLOW-UP VISIT: CPT

## 2024-05-06 NOTE — ASSESSMENT
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 Patient with much improvement since last visit, ROM and strength improved. I recommend proceeding with PT at this time for improvements in ROM postoperatively.   - Recommend physical therapy to regain range of motion, strengthening and symptomatic improvement. Prescription given in office today.   - Continue gabapentin, muscle relaxers as needed  - Recommend NSAIDs PRN - Recommend heating pad use to decrease muscle spasm - Discussed the importance of home exercises, including but not limited to hamstring stretching and core strengthening  Patient was educated on their diagnosis today. All questions answered and patient expressed understanding.  Worker's compensation 100% temporarily disabled.   Follow up in 2 months

## 2024-05-06 NOTE — HISTORY OF PRESENT ILLNESS
[de-identified] : ( Code [id number]) S/P Laminoforaminotomy Discectomy L4-5 3/27/2024. Patient states he feels some pain, burning, and irritation after HEP. Patient ran out of Gabapentin and Cyclobenzaprine. Patient admits to taking Advil for pain.  Today's Pain with walking 6-7/10

## 2024-06-19 ENCOUNTER — NON-APPOINTMENT (OUTPATIENT)
Age: 29
End: 2024-06-19

## 2024-07-08 ENCOUNTER — APPOINTMENT (OUTPATIENT)
Dept: ORTHOPEDIC SURGERY | Facility: CLINIC | Age: 29
End: 2024-07-08
Payer: OTHER MISCELLANEOUS

## 2024-07-08 DIAGNOSIS — M47.814 SPONDYLOSIS W/OUT MYELOPATHY OR RADICULOPATHY, THORACIC REGION: ICD-10-CM

## 2024-07-08 DIAGNOSIS — Z98.890 OTHER SPECIFIED POSTPROCEDURAL STATES: ICD-10-CM

## 2024-07-08 PROCEDURE — 99214 OFFICE O/P EST MOD 30 MIN: CPT

## 2024-08-17 ENCOUNTER — RESULT REVIEW (OUTPATIENT)
Age: 29
End: 2024-08-17

## 2024-09-16 ENCOUNTER — APPOINTMENT (OUTPATIENT)
Dept: ORTHOPEDIC SURGERY | Facility: CLINIC | Age: 29
End: 2024-09-16
Payer: OTHER MISCELLANEOUS

## 2024-09-16 DIAGNOSIS — M51.24 OTHER INTERVERTEBRAL DISC DISPLACEMENT, THORACIC REGION: ICD-10-CM

## 2024-09-16 DIAGNOSIS — Z98.890 OTHER SPECIFIED POSTPROCEDURAL STATES: ICD-10-CM

## 2024-09-16 PROCEDURE — 99214 OFFICE O/P EST MOD 30 MIN: CPT

## 2024-09-16 NOTE — HISTORY OF PRESENT ILLNESS
[de-identified] : Body part: lower back, S/P Laminoforaminotomy Discectomy L4-5 3/27/2024 Better/ Worse/ Same since last visit: same Treatments since last visit: thoracic spine MRI at  Radicular symptoms: into the right glute Paresthesia: none Current medications for pain: Ibuprofen PRN Assistive walking device: none  Today's Pain: 6-7/10  Current work status (if Workers Comp): full time, no restrictions

## 2024-09-16 NOTE — HISTORY OF PRESENT ILLNESS
[de-identified] : Body part: lower back, S/P Laminoforaminotomy Discectomy L4-5 3/27/2024 Better/ Worse/ Same since last visit: same Treatments since last visit: thoracic spine MRI at  Radicular symptoms: into the right glute Paresthesia: none Current medications for pain: Ibuprofen PRN Assistive walking device: none  Today's Pain: 6-7/10  Current work status (if Workers Comp): full time, no restrictions

## 2024-09-16 NOTE — ASSESSMENT
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 Patient with continued resolution of low back pain and radiculopathy following surgery. However, MRI of the thoracic spine with multiple small HNP, likely cause of patient's pain. I discussed with patient will likely improve with PT. However, if persistent, may consider JANET in the future.   - Rx given for Flexeril today   - Recommend physical therapy to regain range of motion, strengthening and symptomatic improvement. Prescription given in office today.    - Patient will continue HEP as detailed in home exercise booklet given in office. Emphasized daily stretching and strengthening.   - Recommend NSAIDs PRN - Recommend heating pad use to decrease muscle spasm - Discussed the importance of home exercises, including but not limited to hamstring stretching and core strengthening  Patient was educated on their diagnosis today. All questions answered and patient expressed understanding.  Worker's compensation 0% temporarily disabled.   Follow up in 2 months

## 2024-09-16 NOTE — DATA REVIEWED
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

## 2024-11-11 ENCOUNTER — APPOINTMENT (OUTPATIENT)
Dept: ORTHOPEDIC SURGERY | Facility: CLINIC | Age: 29
End: 2024-11-11
Payer: OTHER MISCELLANEOUS

## 2024-11-11 DIAGNOSIS — Z98.890 OTHER SPECIFIED POSTPROCEDURAL STATES: ICD-10-CM

## 2024-11-11 DIAGNOSIS — M51.24 OTHER INTERVERTEBRAL DISC DISPLACEMENT, THORACIC REGION: ICD-10-CM

## 2024-11-11 PROCEDURE — 99214 OFFICE O/P EST MOD 30 MIN: CPT

## 2025-01-13 ENCOUNTER — APPOINTMENT (OUTPATIENT)
Dept: PAIN MANAGEMENT | Facility: CLINIC | Age: 30
End: 2025-01-13

## 2025-01-23 ENCOUNTER — APPOINTMENT (OUTPATIENT)
Dept: PAIN MANAGEMENT | Facility: CLINIC | Age: 30
End: 2025-01-23
Payer: OTHER MISCELLANEOUS

## 2025-01-23 VITALS — WEIGHT: 289 LBS | BODY MASS INDEX: 41.37 KG/M2 | HEIGHT: 70 IN

## 2025-01-23 DIAGNOSIS — M54.14 RADICULOPATHY, THORACIC REGION: ICD-10-CM

## 2025-01-23 DIAGNOSIS — M79.18 MYALGIA, OTHER SITE: ICD-10-CM

## 2025-01-23 PROCEDURE — 99214 OFFICE O/P EST MOD 30 MIN: CPT

## 2025-01-27 ENCOUNTER — APPOINTMENT (OUTPATIENT)
Dept: ORTHOPEDIC SURGERY | Facility: CLINIC | Age: 30
End: 2025-01-27
Payer: OTHER MISCELLANEOUS

## 2025-01-27 PROCEDURE — 99214 OFFICE O/P EST MOD 30 MIN: CPT

## 2025-02-06 ENCOUNTER — APPOINTMENT (OUTPATIENT)
Dept: PHYSICAL MEDICINE AND REHAB | Facility: CLINIC | Age: 30
End: 2025-02-06
Payer: OTHER MISCELLANEOUS

## 2025-02-06 VITALS
BODY MASS INDEX: 41.37 KG/M2 | HEART RATE: 76 BPM | WEIGHT: 289 LBS | HEIGHT: 70 IN | DIASTOLIC BLOOD PRESSURE: 80 MMHG | SYSTOLIC BLOOD PRESSURE: 120 MMHG

## 2025-02-06 DIAGNOSIS — M47.814 SPONDYLOSIS W/OUT MYELOPATHY OR RADICULOPATHY, THORACIC REGION: ICD-10-CM

## 2025-02-06 DIAGNOSIS — M79.18 MYALGIA, OTHER SITE: ICD-10-CM

## 2025-02-06 DIAGNOSIS — Z98.890 OTHER SPECIFIED POSTPROCEDURAL STATES: ICD-10-CM

## 2025-02-06 DIAGNOSIS — M51.24 OTHER INTERVERTEBRAL DISC DISPLACEMENT, THORACIC REGION: ICD-10-CM

## 2025-02-06 PROCEDURE — 99205 OFFICE O/P NEW HI 60 MIN: CPT

## 2025-03-21 ENCOUNTER — APPOINTMENT (OUTPATIENT)
Dept: PHYSICAL MEDICINE AND REHAB | Facility: CLINIC | Age: 30
End: 2025-03-21
Payer: OTHER MISCELLANEOUS

## 2025-03-21 DIAGNOSIS — M79.18 MYALGIA, OTHER SITE: ICD-10-CM

## 2025-03-21 DIAGNOSIS — M54.16 RADICULOPATHY, LUMBAR REGION: ICD-10-CM

## 2025-03-21 DIAGNOSIS — M51.24 OTHER INTERVERTEBRAL DISC DISPLACEMENT, THORACIC REGION: ICD-10-CM

## 2025-03-21 PROCEDURE — 97811 ACUP 1/> W/O ESTIM EA ADD 15: CPT | Mod: 59

## 2025-03-21 PROCEDURE — 97814 ACUP 1/> W/ESTIM EA ADDL 15: CPT

## 2025-03-21 PROCEDURE — 97813 ACUP 1/> W/ESTIM 1ST 15 MIN: CPT

## 2025-03-24 ENCOUNTER — APPOINTMENT (OUTPATIENT)
Dept: PHYSICAL MEDICINE AND REHAB | Facility: CLINIC | Age: 30
End: 2025-03-24

## 2025-03-28 ENCOUNTER — APPOINTMENT (OUTPATIENT)
Dept: PHYSICAL MEDICINE AND REHAB | Facility: CLINIC | Age: 30
End: 2025-03-28

## 2025-03-28 DIAGNOSIS — M51.26 OTHER INTERVERTEBRAL DISC DISPLACEMENT, LUMBAR REGION: ICD-10-CM

## 2025-03-28 DIAGNOSIS — Z98.890 OTHER SPECIFIED POSTPROCEDURAL STATES: ICD-10-CM

## 2025-03-28 DIAGNOSIS — M51.369: ICD-10-CM

## 2025-03-28 PROBLEM — M51.379 DEGENERATION OF INTERVERTEBRAL DISC OF LUMBOSACRAL REGION: Status: ACTIVE | Noted: 2023-06-09

## 2025-03-28 PROCEDURE — 97811 ACUP 1/> W/O ESTIM EA ADD 15: CPT | Mod: 59

## 2025-03-28 PROCEDURE — 97814 ACUP 1/> W/ESTIM EA ADDL 15: CPT

## 2025-03-28 PROCEDURE — 97813 ACUP 1/> W/ESTIM 1ST 15 MIN: CPT

## 2025-04-04 ENCOUNTER — APPOINTMENT (OUTPATIENT)
Dept: PHYSICAL MEDICINE AND REHAB | Facility: CLINIC | Age: 30
End: 2025-04-04
Payer: OTHER MISCELLANEOUS

## 2025-04-04 DIAGNOSIS — M79.18 MYALGIA, OTHER SITE: ICD-10-CM

## 2025-04-04 DIAGNOSIS — M47.817 SPONDYLOSIS W/OUT MYELOPATHY OR RADICULOPATHY, LUMBOSACRAL REGION: ICD-10-CM

## 2025-04-04 DIAGNOSIS — M51.369: ICD-10-CM

## 2025-04-04 PROCEDURE — 97811 ACUP 1/> W/O ESTIM EA ADD 15: CPT | Mod: 59

## 2025-04-04 PROCEDURE — 97814 ACUP 1/> W/ESTIM EA ADDL 15: CPT

## 2025-04-04 PROCEDURE — 97813 ACUP 1/> W/ESTIM 1ST 15 MIN: CPT

## 2025-04-07 ENCOUNTER — APPOINTMENT (OUTPATIENT)
Dept: ORTHOPEDIC SURGERY | Facility: CLINIC | Age: 30
End: 2025-04-07
Payer: OTHER MISCELLANEOUS

## 2025-04-07 DIAGNOSIS — M51.24 OTHER INTERVERTEBRAL DISC DISPLACEMENT, THORACIC REGION: ICD-10-CM

## 2025-04-07 DIAGNOSIS — M54.16 RADICULOPATHY, LUMBAR REGION: ICD-10-CM

## 2025-04-07 PROCEDURE — 99214 OFFICE O/P EST MOD 30 MIN: CPT

## 2025-04-11 ENCOUNTER — APPOINTMENT (OUTPATIENT)
Dept: PHYSICAL MEDICINE AND REHAB | Facility: CLINIC | Age: 30
End: 2025-04-11
Payer: OTHER MISCELLANEOUS

## 2025-04-11 DIAGNOSIS — M51.369: ICD-10-CM

## 2025-04-11 DIAGNOSIS — M51.26 OTHER INTERVERTEBRAL DISC DISPLACEMENT, LUMBAR REGION: ICD-10-CM

## 2025-04-11 PROCEDURE — 97813 ACUP 1/> W/ESTIM 1ST 15 MIN: CPT

## 2025-04-11 PROCEDURE — 97814 ACUP 1/> W/ESTIM EA ADDL 15: CPT

## 2025-04-11 PROCEDURE — 97811 ACUP 1/> W/O ESTIM EA ADD 15: CPT | Mod: 59

## 2025-04-18 ENCOUNTER — APPOINTMENT (OUTPATIENT)
Dept: PHYSICAL MEDICINE AND REHAB | Facility: CLINIC | Age: 30
End: 2025-04-18
Payer: OTHER MISCELLANEOUS

## 2025-04-18 PROCEDURE — 97813 ACUP 1/> W/ESTIM 1ST 15 MIN: CPT

## 2025-04-18 PROCEDURE — 97814 ACUP 1/> W/ESTIM EA ADDL 15: CPT

## 2025-04-18 PROCEDURE — 97811 ACUP 1/> W/O ESTIM EA ADD 15: CPT | Mod: 59

## 2025-04-25 ENCOUNTER — APPOINTMENT (OUTPATIENT)
Dept: PHYSICAL MEDICINE AND REHAB | Facility: CLINIC | Age: 30
End: 2025-04-25
Payer: OTHER MISCELLANEOUS

## 2025-04-25 DIAGNOSIS — M51.24 OTHER INTERVERTEBRAL DISC DISPLACEMENT, THORACIC REGION: ICD-10-CM

## 2025-04-25 DIAGNOSIS — M54.16 RADICULOPATHY, LUMBAR REGION: ICD-10-CM

## 2025-04-25 DIAGNOSIS — M51.26 OTHER INTERVERTEBRAL DISC DISPLACEMENT, LUMBAR REGION: ICD-10-CM

## 2025-04-25 DIAGNOSIS — M47.817 SPONDYLOSIS W/OUT MYELOPATHY OR RADICULOPATHY, LUMBOSACRAL REGION: ICD-10-CM

## 2025-04-25 DIAGNOSIS — M51.379 OTH INTVRT DISC DEGEN, LUMBOSACR W/O LUM BCK OR LW EXTRM PN: ICD-10-CM

## 2025-04-25 DIAGNOSIS — M79.18 MYALGIA, OTHER SITE: ICD-10-CM

## 2025-04-25 PROCEDURE — 97811 ACUP 1/> W/O ESTIM EA ADD 15: CPT

## 2025-04-25 PROCEDURE — 97813 ACUP 1/> W/ESTIM 1ST 15 MIN: CPT

## 2025-04-25 PROCEDURE — 97814 ACUP 1/> W/ESTIM EA ADDL 15: CPT | Mod: 59

## 2025-05-02 ENCOUNTER — APPOINTMENT (OUTPATIENT)
Dept: PHYSICAL MEDICINE AND REHAB | Facility: CLINIC | Age: 30
End: 2025-05-02
Payer: OTHER MISCELLANEOUS

## 2025-05-02 DIAGNOSIS — M54.16 RADICULOPATHY, LUMBAR REGION: ICD-10-CM

## 2025-05-02 PROCEDURE — 97811 ACUP 1/> W/O ESTIM EA ADD 15: CPT | Mod: 59

## 2025-05-02 PROCEDURE — 97813 ACUP 1/> W/ESTIM 1ST 15 MIN: CPT

## 2025-05-02 PROCEDURE — 97814 ACUP 1/> W/ESTIM EA ADDL 15: CPT

## 2025-05-09 ENCOUNTER — APPOINTMENT (OUTPATIENT)
Dept: PHYSICAL MEDICINE AND REHAB | Facility: CLINIC | Age: 30
End: 2025-05-09
Payer: OTHER MISCELLANEOUS

## 2025-05-09 ENCOUNTER — APPOINTMENT (OUTPATIENT)
Dept: PHYSICAL MEDICINE AND REHAB | Facility: CLINIC | Age: 30
End: 2025-05-09

## 2025-05-09 DIAGNOSIS — M79.18 MYALGIA, OTHER SITE: ICD-10-CM

## 2025-05-09 DIAGNOSIS — Z98.890 OTHER SPECIFIED POSTPROCEDURAL STATES: ICD-10-CM

## 2025-05-09 DIAGNOSIS — M51.369: ICD-10-CM

## 2025-05-09 DIAGNOSIS — M51.24 OTHER INTERVERTEBRAL DISC DISPLACEMENT, THORACIC REGION: ICD-10-CM

## 2025-05-09 DIAGNOSIS — M51.26 OTHER INTERVERTEBRAL DISC DISPLACEMENT, LUMBAR REGION: ICD-10-CM

## 2025-05-09 DIAGNOSIS — M51.379 OTH INTVRT DISC DEGEN, LUMBOSACR W/O LUM BCK OR LW EXTRM PN: ICD-10-CM

## 2025-05-09 PROCEDURE — 97811 ACUP 1/> W/O ESTIM EA ADD 15: CPT | Mod: 59

## 2025-05-09 PROCEDURE — 97814 ACUP 1/> W/ESTIM EA ADDL 15: CPT

## 2025-05-09 PROCEDURE — 97813 ACUP 1/> W/ESTIM 1ST 15 MIN: CPT

## 2025-07-28 ENCOUNTER — APPOINTMENT (OUTPATIENT)
Dept: ORTHOPEDIC SURGERY | Facility: CLINIC | Age: 30
End: 2025-07-28
Payer: OTHER MISCELLANEOUS

## 2025-07-28 DIAGNOSIS — Z98.890 OTHER SPECIFIED POSTPROCEDURAL STATES: ICD-10-CM

## 2025-07-28 DIAGNOSIS — M47.814 SPONDYLOSIS W/OUT MYELOPATHY OR RADICULOPATHY, THORACIC REGION: ICD-10-CM

## 2025-07-28 DIAGNOSIS — M51.24 OTHER INTERVERTEBRAL DISC DISPLACEMENT, THORACIC REGION: ICD-10-CM

## 2025-07-28 PROCEDURE — 99214 OFFICE O/P EST MOD 30 MIN: CPT

## 2025-09-04 ENCOUNTER — APPOINTMENT (OUTPATIENT)
Dept: PHYSICAL MEDICINE AND REHAB | Facility: CLINIC | Age: 30
End: 2025-09-04
Payer: OTHER MISCELLANEOUS

## 2025-09-04 DIAGNOSIS — M79.18 MYALGIA, OTHER SITE: ICD-10-CM

## 2025-09-04 DIAGNOSIS — M47.817 SPONDYLOSIS W/OUT MYELOPATHY OR RADICULOPATHY, LUMBOSACRAL REGION: ICD-10-CM

## 2025-09-04 DIAGNOSIS — M54.16 RADICULOPATHY, LUMBAR REGION: ICD-10-CM

## 2025-09-04 DIAGNOSIS — M51.369: ICD-10-CM

## 2025-09-04 DIAGNOSIS — Z98.890 OTHER SPECIFIED POSTPROCEDURAL STATES: ICD-10-CM

## 2025-09-04 PROCEDURE — 99213 OFFICE O/P EST LOW 20 MIN: CPT

## 2025-09-15 ENCOUNTER — APPOINTMENT (OUTPATIENT)
Dept: ORTHOPEDIC SURGERY | Facility: CLINIC | Age: 30
End: 2025-09-15
Payer: OTHER MISCELLANEOUS

## 2025-09-15 DIAGNOSIS — M51.379 OTH INTVRT DISC DEGEN, LUMBOSACR W/O LUM BCK OR LW EXTRM PN: ICD-10-CM

## 2025-09-15 DIAGNOSIS — M51.369: ICD-10-CM

## 2025-09-15 DIAGNOSIS — M54.16 RADICULOPATHY, LUMBAR REGION: ICD-10-CM

## 2025-09-15 DIAGNOSIS — M47.814 SPONDYLOSIS W/OUT MYELOPATHY OR RADICULOPATHY, THORACIC REGION: ICD-10-CM

## 2025-09-15 DIAGNOSIS — M51.24 OTHER INTERVERTEBRAL DISC DISPLACEMENT, THORACIC REGION: ICD-10-CM

## 2025-09-15 DIAGNOSIS — M51.26 OTHER INTERVERTEBRAL DISC DISPLACEMENT, LUMBAR REGION: ICD-10-CM

## 2025-09-15 DIAGNOSIS — Z98.890 OTHER SPECIFIED POSTPROCEDURAL STATES: ICD-10-CM

## 2025-09-15 PROCEDURE — 99214 OFFICE O/P EST MOD 30 MIN: CPT
